# Patient Record
Sex: FEMALE | Race: ASIAN | NOT HISPANIC OR LATINO
[De-identification: names, ages, dates, MRNs, and addresses within clinical notes are randomized per-mention and may not be internally consistent; named-entity substitution may affect disease eponyms.]

---

## 2017-11-26 ENCOUNTER — TRANSCRIPTION ENCOUNTER (OUTPATIENT)
Age: 22
End: 2017-11-26

## 2018-09-14 ENCOUNTER — RX RENEWAL (OUTPATIENT)
Age: 23
End: 2018-09-14

## 2018-09-14 DIAGNOSIS — F32.9 MAJOR DEPRESSIVE DISORDER, SINGLE EPISODE, UNSPECIFIED: ICD-10-CM

## 2018-09-17 ENCOUNTER — RX RENEWAL (OUTPATIENT)
Age: 23
End: 2018-09-17

## 2019-02-22 ENCOUNTER — RECORD ABSTRACTING (OUTPATIENT)
Age: 24
End: 2019-02-22

## 2019-03-12 ENCOUNTER — LABORATORY RESULT (OUTPATIENT)
Age: 24
End: 2019-03-12

## 2019-03-13 ENCOUNTER — APPOINTMENT (OUTPATIENT)
Dept: INTERNAL MEDICINE | Facility: CLINIC | Age: 24
End: 2019-03-13
Payer: MEDICAID

## 2019-03-13 VITALS
BODY MASS INDEX: 18.12 KG/M2 | HEART RATE: 66 BPM | OXYGEN SATURATION: 99 % | HEIGHT: 61 IN | SYSTOLIC BLOOD PRESSURE: 90 MMHG | TEMPERATURE: 98.3 F | WEIGHT: 96 LBS | DIASTOLIC BLOOD PRESSURE: 60 MMHG

## 2019-03-13 DIAGNOSIS — Z11.1 ENCOUNTER FOR SCREENING FOR RESPIRATORY TUBERCULOSIS: ICD-10-CM

## 2019-03-13 DIAGNOSIS — Z00.00 ENCOUNTER FOR GENERAL ADULT MEDICAL EXAMINATION W/OUT ABNORMAL FINDINGS: ICD-10-CM

## 2019-03-13 DIAGNOSIS — Z11.3 ENCOUNTER FOR SCREENING FOR INFECTIONS WITH A PREDOMINANTLY SEXUAL MODE OF TRANSMISSION: ICD-10-CM

## 2019-03-13 PROCEDURE — 36415 COLL VENOUS BLD VENIPUNCTURE: CPT

## 2019-03-13 PROCEDURE — 99395 PREV VISIT EST AGE 18-39: CPT | Mod: 25

## 2019-03-13 NOTE — PLAN
Faxed refill request for prevacid. Last seen 3/17, next seen 8/10.
[FreeTextEntry1] : Check general labs and STD screening.\par Check QuantiFERON.\par Return to office in August for followup.

## 2019-03-13 NOTE — PHYSICAL EXAM
[No Acute Distress] : no acute distress [Normal Sclera/Conjunctiva] : normal sclera/conjunctiva [Normal Outer Ear/Nose] : the outer ears and nose were normal in appearance [Thyroid Normal, No Nodules] : the thyroid was normal and there were no nodules present [No Respiratory Distress] : no respiratory distress  [Clear to Auscultation] : lungs were clear to auscultation bilaterally [Normal Rate] : normal rate  [Regular Rhythm] : with a regular rhythm [Declined Breast Exam] : declined breast exam  [Soft] : abdomen soft [No Masses] : no abdominal mass palpated [Normal Posterior Cervical Nodes] : no posterior cervical lymphadenopathy [Normal Anterior Cervical Nodes] : no anterior cervical lymphadenopathy [No Joint Swelling] : no joint swelling [Coordination Grossly Intact] : coordination grossly intact [Normal Affect] : the affect was normal [Kyphosis] : no kyphosis

## 2019-03-13 NOTE — HISTORY OF PRESENT ILLNESS
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 \par She is sexually active but no pap yet.\par Lexapro is working great.

## 2019-03-17 LAB
ALBUMIN SERPL ELPH-MCNC: 4.7 G/DL
ALP BLD-CCNC: 65 U/L
ALT SERPL-CCNC: 5 U/L
ANION GAP SERPL CALC-SCNC: 10 MMOL/L
APPEARANCE: ABNORMAL
AST SERPL-CCNC: 12 U/L
BACTERIA: NEGATIVE
BASOPHILS # BLD AUTO: 0.09 K/UL
BASOPHILS NFR BLD AUTO: 1.8 %
BILIRUB SERPL-MCNC: 0.5 MG/DL
BILIRUBIN URINE: NEGATIVE
BLOOD URINE: NEGATIVE
BUN SERPL-MCNC: 11 MG/DL
C TRACH RRNA SPEC QL NAA+PROBE: NOT DETECTED
CALCIUM SERPL-MCNC: 10 MG/DL
CHLORIDE SERPL-SCNC: 105 MMOL/L
CHOLEST SERPL-MCNC: 171 MG/DL
CHOLEST/HDLC SERPL: 3.4 RATIO
CO2 SERPL-SCNC: 26 MMOL/L
COLOR: ABNORMAL
CREAT SERPL-MCNC: 0.55 MG/DL
EOSINOPHIL # BLD AUTO: 0.18 K/UL
EOSINOPHIL NFR BLD AUTO: 3.6 %
GLUCOSE QUALITATIVE U: NEGATIVE
GLUCOSE SERPL-MCNC: 85 MG/DL
HBA1C MFR BLD HPLC: 4.7 %
HBV SURFACE AB SER QL: NONREACTIVE
HBV SURFACE AG SER QL: NONREACTIVE
HCT VFR BLD CALC: 42.1 %
HCV AB SER QL: NONREACTIVE
HCV S/CO RATIO: 0.15 S/CO
HDLC SERPL-MCNC: 50 MG/DL
HGB BLD-MCNC: 13.5 G/DL
HIV1+2 AB SPEC QL IA.RAPID: NONREACTIVE
HYALINE CASTS: 1 /LPF
IMM GRANULOCYTES NFR BLD AUTO: 0.2 %
KETONES URINE: NEGATIVE
LDLC SERPL CALC-MCNC: 107 MG/DL
LEUKOCYTE ESTERASE URINE: NEGATIVE
LYMPHOCYTES # BLD AUTO: 1.35 K/UL
LYMPHOCYTES NFR BLD AUTO: 26.9 %
MAN DIFF?: NORMAL
MCHC RBC-ENTMCNC: 27 PG
MCHC RBC-ENTMCNC: 32.1 GM/DL
MCV RBC AUTO: 84.2 FL
MICROSCOPIC-UA: NORMAL
MONOCYTES # BLD AUTO: 0.4 K/UL
MONOCYTES NFR BLD AUTO: 8 %
N GONORRHOEA RRNA SPEC QL NAA+PROBE: NOT DETECTED
NEUTROPHILS # BLD AUTO: 2.99 K/UL
NEUTROPHILS NFR BLD AUTO: 59.5 %
NITRITE URINE: NEGATIVE
PH URINE: 6.5
PLATELET # BLD AUTO: 306 K/UL
POTASSIUM SERPL-SCNC: 4.7 MMOL/L
PROT SERPL-MCNC: 7.2 G/DL
PROTEIN URINE: NORMAL
RBC # BLD: 5 M/UL
RBC # FLD: 12.8 %
RED BLOOD CELLS URINE: 1 /HPF
SODIUM SERPL-SCNC: 140 MMOL/L
SOURCE AMPLIFICATION: NORMAL
SPECIFIC GRAVITY URINE: 1.03
SQUAMOUS EPITHELIAL CELLS: 4 /HPF
T PALLIDUM AB SER QL IA: NEGATIVE
TRIGL SERPL-MCNC: 68 MG/DL
TSH SERPL-ACNC: 4.29 UIU/ML
UROBILINOGEN URINE: NORMAL
VIT B12 SERPL-MCNC: 451 PG/ML
WBC # FLD AUTO: 5.02 K/UL
WHITE BLOOD CELLS URINE: 1 /HPF

## 2019-03-19 LAB
M TB IFN-G BLD-IMP: NEGATIVE
QUANTIFERON TB PLUS MITOGEN MINUS NIL: 9.95 IU/ML
QUANTIFERON TB PLUS NIL: 0.02 IU/ML
QUANTIFERON TB PLUS TB1 MINUS NIL: -0.01 IU/ML
QUANTIFERON TB PLUS TB2 MINUS NIL: -0.01 IU/ML

## 2019-03-20 ENCOUNTER — TRANSCRIPTION ENCOUNTER (OUTPATIENT)
Age: 24
End: 2019-03-20

## 2019-03-20 ENCOUNTER — RESULT REVIEW (OUTPATIENT)
Age: 24
End: 2019-03-20

## 2019-05-20 ENCOUNTER — TRANSCRIPTION ENCOUNTER (OUTPATIENT)
Age: 24
End: 2019-05-20

## 2019-06-27 ENCOUNTER — EMERGENCY (EMERGENCY)
Facility: HOSPITAL | Age: 24
LOS: 1 days | Discharge: HOME | End: 2019-06-27
Attending: EMERGENCY MEDICINE | Admitting: EMERGENCY MEDICINE
Payer: MEDICAID

## 2019-06-27 VITALS
HEART RATE: 92 BPM | TEMPERATURE: 99 F | DIASTOLIC BLOOD PRESSURE: 86 MMHG | RESPIRATION RATE: 18 BRPM | OXYGEN SATURATION: 98 % | SYSTOLIC BLOOD PRESSURE: 119 MMHG

## 2019-06-27 PROCEDURE — 99284 EMERGENCY DEPT VISIT MOD MDM: CPT

## 2019-06-27 PROCEDURE — 99283 EMERGENCY DEPT VISIT LOW MDM: CPT

## 2019-06-27 RX ORDER — MAGNESIUM HYDROXIDE 400 MG/1
30 TABLET, CHEWABLE ORAL ONCE
Refills: 0 | Status: COMPLETED | OUTPATIENT
Start: 2019-06-27 | End: 2019-06-27

## 2019-06-27 RX ADMIN — MAGNESIUM HYDROXIDE 30 MILLILITER(S): 400 TABLET, CHEWABLE ORAL at 01:04

## 2019-06-27 NOTE — ED PROVIDER NOTE - OBJECTIVE STATEMENT
25yo female who presents with constipation. pt states she was constipated for a few days, took some mag citrate with some relief, but pt feels like she has togo to the bathroom and 25yo female who presents with constipation. pt states she was constipated for a few days, took some mag citrate with some relief, but pt feels like she has togo to the bathroom and have BM, no bleeding, no vomiting, no nausea, no other compainst

## 2019-06-27 NOTE — ED ADULT NURSE NOTE - CHPI ED NUR SYMPTOMS NEG
no diarrhea/no hematuria/no chills/no blood in stool/no burning urination/no dysuria/no fever/no nausea/no vomiting/no abdominal distension

## 2019-06-27 NOTE — ED ADULT NURSE NOTE - OBJECTIVE STATEMENT
Patient alert and oriented X 3. Complaining of constipation for couple days. Patient took magnesium citrate, Doculax with no relief. Denies chest pain, shortness of breath, nausea, vomiting, headache, dizziness and fever.

## 2019-06-27 NOTE — ED ADULT NURSE NOTE - NS ED NURSE DC INFO COMPLEXITY
Telephone Encounter by Gurmeet Baxter MD at 10/17/17 02:18 PM     Author:  Gurmeet Baxter MD Service:  (none) Author Type:  Physician     Filed:  10/17/17 02:19 PM Encounter Date:  10/17/2017 Status:  Signed     :  Gurmeet Baxter MD (Physician)            Okay for letter as requested.[KB1.1T]       Revision History        User Key Date/Time User Provider Type Action    > KB1.1 10/17/17 02:19 PM Gurmeet Baxter MD Physician Sign    T - Template             Simple: Patient demonstrates quick and easy understanding

## 2019-07-08 ENCOUNTER — RX RENEWAL (OUTPATIENT)
Age: 24
End: 2019-07-08

## 2019-07-08 RX ORDER — ESCITALOPRAM OXALATE 20 MG/1
20 TABLET ORAL
Qty: 90 | Refills: 1 | Status: ACTIVE | COMMUNITY
Start: 2018-09-14 | End: 1900-01-01

## 2019-10-04 ENCOUNTER — TRANSCRIPTION ENCOUNTER (OUTPATIENT)
Age: 24
End: 2019-10-04

## 2020-05-04 NOTE — ED ADULT NURSE NOTE - NS_ED_NURSE_TEACHING_TOPIC_ED_A_ED
Case Management Discharge Note      Final Note: Discharged home self care.                      Final Discharge Disposition Code: 01 - home or self-care   pmd

## 2020-09-11 NOTE — ED ADULT NURSE NOTE - NSSUSCREENINGQ4_ED_ALL_ED
Before leaving, please make sure you have all your personal belongings such as glasses, purses, wallets, keys, cell phones, jewelry, jackets etc     Post Procedure Instructions  -For the first 24 hours after procedure apply an ice pack to the site for 20 minute periods and repeating can help relieve pain at the site.  -If you have stopped taking Aspirin prior to the procedure, please restart taking it from the day after your procedure. You may restart your plavix or Coumadin the day after your procedure unless otherwise instructed   Keep clear dressing on for 48 hrs, then remove. Leave paper strips on until they fall off  -Avoid submerging area in water for 5 days- so no bathtub, hot tub, swimming pool  May wet in shower after 48 hrs  -If numbing medication was administered, be careful getting around.    Elbow and Shoulder  -Rest arm or shoulder  the day of procedure   -wear sling for 48 hours after procedure  - Light activity for 3 weeks then progress as tolerated         Please seek medical help immediately if any of the following symptoms occur:  1. Sever increase in your usual pain or appearance of new pain  2. Prolonged or increasing weakness or numbness in the legs or arms- longer than 6 hours.  3. Drainage, redness, active bleeding, or increasing swelling at the injection site   4. Temperature over 100.0 degrees F       No

## 2021-05-17 ENCOUNTER — TRANSCRIPTION ENCOUNTER (OUTPATIENT)
Age: 26
End: 2021-05-17

## 2021-06-02 ENCOUNTER — TRANSCRIPTION ENCOUNTER (OUTPATIENT)
Age: 26
End: 2021-06-02

## 2022-02-17 NOTE — ED ADULT NURSE NOTE - NSFALLRSKPASTHIST_ED_ALL_ED
Called patient's mother, will send in letter to [employer], patient's mother verbalized understanding.    no